# Patient Record
Sex: MALE | Race: WHITE | URBAN - METROPOLITAN AREA
[De-identification: names, ages, dates, MRNs, and addresses within clinical notes are randomized per-mention and may not be internally consistent; named-entity substitution may affect disease eponyms.]

---

## 2022-10-25 ENCOUNTER — OFFICE VISIT (OUTPATIENT)
Dept: FAMILY MEDICINE CLINIC | Age: 32
End: 2022-10-25
Payer: COMMERCIAL

## 2022-10-25 VITALS
HEART RATE: 66 BPM | HEIGHT: 72 IN | SYSTOLIC BLOOD PRESSURE: 115 MMHG | OXYGEN SATURATION: 96 % | DIASTOLIC BLOOD PRESSURE: 74 MMHG | RESPIRATION RATE: 16 BRPM | TEMPERATURE: 98.6 F | WEIGHT: 171 LBS | BODY MASS INDEX: 23.16 KG/M2

## 2022-10-25 DIAGNOSIS — Z12.83 SKIN CANCER SCREENING: ICD-10-CM

## 2022-10-25 DIAGNOSIS — M41.25 OTHER IDIOPATHIC SCOLIOSIS, THORACOLUMBAR REGION: ICD-10-CM

## 2022-10-25 DIAGNOSIS — Z00.00 GENERAL MEDICAL EXAM: Primary | ICD-10-CM

## 2022-10-25 DIAGNOSIS — N39.9 URINARY PROBLEM IN MALE: ICD-10-CM

## 2022-10-25 LAB
BILIRUB UR QL STRIP: NEGATIVE
GLUCOSE UR-MCNC: NEGATIVE MG/DL
KETONES P FAST UR STRIP-MCNC: NEGATIVE MG/DL
PH UR STRIP: 5.5 [PH] (ref 4.6–8)
PROT UR QL STRIP: NEGATIVE
SP GR UR STRIP: 1.02 (ref 1–1.03)
UA UROBILINOGEN AMB POC: NORMAL (ref 0.2–1)
URINALYSIS CLARITY POC: CLEAR
URINALYSIS COLOR POC: YELLOW
URINE BLOOD POC: NEGATIVE
URINE LEUKOCYTES POC: NEGATIVE
URINE NITRITES POC: NEGATIVE

## 2022-10-25 PROCEDURE — 99385 PREV VISIT NEW AGE 18-39: CPT | Performed by: FAMILY MEDICINE

## 2022-10-25 RX ORDER — CETIRIZINE HYDROCHLORIDE 10 MG/1
CAPSULE, LIQUID FILLED ORAL
COMMUNITY

## 2022-10-25 NOTE — PROGRESS NOTES
History of Present Illness:     Chief Complaint   Patient presents with    [facility]     Pt is a 33y.o. year old male    Presents to clinic to [facility].     Recently seen at an urgent care for penile discharge and itching  Started on antibiotics but stopped after testing came back negative  Followed by bladder fullness  All symptoms have since resolved    Sexually active with one female partner (wife)  No prior history of STIs  Has had a yeast infection in the past     Currently c/o congestion, runny nose  No fever  Tested negative for COVID home 2 days ago     Dx of Scoliosis  Previously followed by Ortho surgery  Required back brace in the past  Worries his posture is getting worse    I have reviewed patient's history as detailed below:    Past Medical History:   Diagnosis Date    Scoliosis        Past Surgical History:   Procedure Laterality Date    HX HEMORRHOIDECTOMY           Family History   Problem Relation Age of Onset    Neuropathy Father     Diabetes Maternal Grandmother     Dementia Paternal Grandmother     Heart Disease Paternal Grandfather        Social History     Socioeconomic History    Marital status: SINGLE     Spouse name: Not on file    Number of children: Not on file    Years of education: Not on file    Highest education level: Not on file   Occupational History    Not on file   Tobacco Use    Smoking status: Never    Smokeless tobacco: Never   Vaping Use    Vaping Use: Never used   Substance and Sexual Activity    Alcohol use: Never    Drug use: Never    Sexual activity: Not on file   Other Topics Concern    Not on file   Social History Narrative    Not on file     Social Determinants of Health     Financial Resource Strain: Not on file   Food Insecurity: Not on file   Transportation Needs: Not on file   Physical Activity: Not on file   Stress: Not on file   Social Connections: Not on file   Intimate Partner Violence: Not on file   Housing Stability: Not on file         Current Outpatient Medications on File Prior to Visit   Medication Sig Dispense Refill    Cetirizine (ZyrTEC) 10 mg cap Take  by mouth. No current facility-administered medications on file prior to visit. Allergies:  No Known Allergies      Review of systems:  Items bolded if positive. Constitutional: Fever, chills, night sweats, weight loss, lymphadenopathy, fatigue  HEENT: Vision change, eye pain, rhinorrhea, sinus pain, epistaxis, dysphagia, change in hearing, tinnitus, vertigo.    Endocrine: Weight change, heat/ cold intolerance, tremor, insomnia, polyuria, polydipsia, polyphagia, abnl hair growth, nail changes  Cardiovascular: Chest pain, palpitations, syncope, lower extremity edema, orthopnea, paroxysmal nocturnal dyspnea  Pulmonary: Shortness of breath, dyspnea on exertion, cough, hemoptysis, wheezing  GI: Nausea, vomiting, diarrhea, melena, hematochezia, change in appetite, abdominal pain, change in bowel habits or stools  : Dysuria, frequency, urgency, incontinence, hematuria, nocturia  Musculoskeletal: joint swelling or pain, muscle pain, back pain  Skin:  Rash, New/growing/changing skin lesions  Neurologic: Headache, muscle weakness, paresthesias, anesthesia, ataxia, change in speech, change in gait   Psychiatric: depression, anxiety, hallucinations, troy, SI/HI        Objective:     Vitals:    10/25/22 0859   BP: 115/74   Pulse: 66   Resp: 16   Temp: 98.6 °F (37 °C)   TempSrc: Oral   SpO2: 96%   Weight: 171 lb (77.6 kg)   Height: 6' (1.829 m)       Physical Exam:  General appearance - alert, well appearing, and in no distress  Mental status - alert, oriented to person, place, and time, normal mood, behavior, speech, dress, motor activity, and thought processes  Neck - supple, no significant adenopathy, thyroid exam: thyroid is normal in size without nodules or tenderness  Chest - clear to auscultation, no wheezes, rales or rhonchi, symmetric air entry  Heart - normal rate, regular rhythm, normal S1, S2, no murmurs, rubs, clicks or gallops  Abdomen - soft, nontender, nondistended, no masses or organomegaly  Back exam - scoliosis noted, high riding scapula on the right. Neurological - alert, oriented, normal speech, no focal findings or movement disorder noted  Extremities - peripheral pulses normal, no pedal edema, no clubbing or cyanosis      Recent Labs:  No results found for this or any previous visit (from the past 12 hour(s)). Assessment and Plan:   Pt is a 33y.o. year old male,      ICD-10-CM ICD-9-CM    1. General medical exam  Z00.00 V70.9 CBC W/O DIFF      METABOLIC PANEL, COMPREHENSIVE      LIPID PANEL      LIPID PANEL      METABOLIC PANEL, COMPREHENSIVE      CBC W/O DIFF      2. Other idiopathic scoliosis, thoracolumbar region  M41.25 737.30       3. Skin cancer screening  Z12.83 V76.43 REFERRAL TO DERMATOLOGY      4. Urinary problem in male  N39.9 V47.4 AMB POC URINALYSIS DIP STICK AUTO W/O MICRO          Establish care  Well overall  Will check labs today    Idiopathic scoliosis  Referred to Sports Med    Skin cancer screening  Derm referral placed     Urinary problem  Seems to have resolved but will check UA    Follow up for annual wellness, sooner if needed    Dandre Ochoa MD    I have discussed the diagnosis with the patient and the intended plan as seen in the above orders. The patient has received an after-visit summary and questions were answered concerning future plans.

## 2022-10-25 NOTE — PROGRESS NOTES
Lam Carrera is a 33 y.o. male    Chief Complaint   Patient presents with    Establish Care       1. Have you been to the ER, urgent care clinic since your last visit? Hospitalized since your last visit? No  2. Have you seen or consulted any other health care providers outside of the [address] [place] [address] since your last visit? Include any pap smears or colon screening. No    Visit Vitals  /74 (BP 1 Location: Left upper arm, BP Patient Position: Sitting)   Pulse 66   Temp 98.6 °F (37 °C) (Oral)   Resp 16   Ht 6' (1.829 m)   Wt 171 lb (77.6 kg)   SpO2 96%   BMI 23.19 kg/m²     3 most recent PHQ Screens 10/25/2022   Little interest or pleasure in doing things Not at all   Feeling down, depressed, irritable, or hopeless Not at all   Total Score PHQ 2 0     Health Maintenance Due   Topic Date Due    Hepatitis C Screening  Never done    Depression Screen  Never done    COVID-19 Vaccine (1) Never done    Flu Vaccine (1) Never done     Dayquil and nightquil   Issues urinating    Itchiness and discharge. Does remember being intimate with   Had gc/ chlamydia and bacteria culture tested, all negative but was given medicine awaiting results. Discharge and itchiness went away but was having fullness. Illness started Saturday and tested negative for Covid on Sunday. Sunday was the worst feels better today other sound.      Has had ~3 covid vaccines and flu shot     Hemmorhoidectomy feb 22 nov 21

## 2022-10-26 LAB
ALBUMIN SERPL-MCNC: 4 G/DL (ref 3.5–5)
ALBUMIN/GLOB SERPL: 1.3 {RATIO} (ref 1.1–2.2)
ALP SERPL-CCNC: 55 U/L (ref 45–117)
ALT SERPL-CCNC: 20 U/L (ref 12–78)
ANION GAP SERPL CALC-SCNC: 3 MMOL/L (ref 5–15)
AST SERPL-CCNC: 13 U/L (ref 15–37)
BILIRUB SERPL-MCNC: 0.4 MG/DL (ref 0.2–1)
BUN SERPL-MCNC: 15 MG/DL (ref 6–20)
BUN/CREAT SERPL: 13 (ref 12–20)
CALCIUM SERPL-MCNC: 9.2 MG/DL (ref 8.5–10.1)
CHLORIDE SERPL-SCNC: 103 MMOL/L (ref 97–108)
CHOLEST SERPL-MCNC: 140 MG/DL
CO2 SERPL-SCNC: 32 MMOL/L (ref 21–32)
CREAT SERPL-MCNC: 1.14 MG/DL (ref 0.7–1.3)
ERYTHROCYTE [DISTWIDTH] IN BLOOD BY AUTOMATED COUNT: 12.9 % (ref 11.5–14.5)
GLOBULIN SER CALC-MCNC: 3.1 G/DL (ref 2–4)
GLUCOSE SERPL-MCNC: 70 MG/DL (ref 65–100)
HCT VFR BLD AUTO: 47.4 % (ref 36.6–50.3)
HDLC SERPL-MCNC: 38 MG/DL
HDLC SERPL: 3.7 {RATIO} (ref 0–5)
HGB BLD-MCNC: 15.1 G/DL (ref 12.1–17)
LDLC SERPL CALC-MCNC: 57.4 MG/DL (ref 0–100)
MCH RBC QN AUTO: 29.7 PG (ref 26–34)
MCHC RBC AUTO-ENTMCNC: 31.9 G/DL (ref 30–36.5)
MCV RBC AUTO: 93.1 FL (ref 80–99)
NRBC # BLD: 0 K/UL (ref 0–0.01)
NRBC BLD-RTO: 0 PER 100 WBC
PLATELET # BLD AUTO: 215 K/UL (ref 150–400)
PMV BLD AUTO: 11 FL (ref 8.9–12.9)
POTASSIUM SERPL-SCNC: 4.5 MMOL/L (ref 3.5–5.1)
PROT SERPL-MCNC: 7.1 G/DL (ref 6.4–8.2)
RBC # BLD AUTO: 5.09 M/UL (ref 4.1–5.7)
SODIUM SERPL-SCNC: 138 MMOL/L (ref 136–145)
TRIGL SERPL-MCNC: 223 MG/DL (ref ?–150)
VLDLC SERPL CALC-MCNC: 44.6 MG/DL
WBC # BLD AUTO: 6.3 K/UL (ref 4.1–11.1)

## 2024-02-21 ENCOUNTER — OFFICE VISIT (OUTPATIENT)
Age: 34
End: 2024-02-21
Payer: COMMERCIAL

## 2024-02-21 VITALS
WEIGHT: 160.4 LBS | RESPIRATION RATE: 18 BRPM | TEMPERATURE: 98 F | BODY MASS INDEX: 21.73 KG/M2 | HEIGHT: 72 IN | HEART RATE: 77 BPM | SYSTOLIC BLOOD PRESSURE: 120 MMHG | DIASTOLIC BLOOD PRESSURE: 80 MMHG | OXYGEN SATURATION: 97 %

## 2024-02-21 DIAGNOSIS — E78.2 ELEVATED TRIGLYCERIDES WITH HIGH CHOLESTEROL: ICD-10-CM

## 2024-02-21 DIAGNOSIS — Z01.84 IMMUNITY STATUS TESTING: ICD-10-CM

## 2024-02-21 DIAGNOSIS — Z00.00 VISIT FOR WELL MAN HEALTH CHECK: Primary | ICD-10-CM

## 2024-02-21 PROBLEM — K60.2 FISSURE, ANAL: Status: ACTIVE | Noted: 2023-08-29

## 2024-02-21 PROBLEM — K64.9 HEMORRHOIDS: Status: ACTIVE | Noted: 2023-08-29

## 2024-02-21 PROCEDURE — 99395 PREV VISIT EST AGE 18-39: CPT | Performed by: FAMILY MEDICINE

## 2024-02-21 PROCEDURE — G8484 FLU IMMUNIZE NO ADMIN: HCPCS | Performed by: FAMILY MEDICINE

## 2024-02-21 RX ORDER — POLYETHYLENE GLYCOL 3350 17 G/17G
17 POWDER, FOR SOLUTION ORAL NIGHTLY
COMMUNITY

## 2024-02-21 RX ORDER — LIDOCAINE 5% 5 G/100G
CREAM TOPICAL
COMMUNITY
Start: 2024-01-18

## 2024-02-21 RX ORDER — AMITRIPTYLINE HYDROCHLORIDE 10 MG/1
10 TABLET, FILM COATED ORAL NIGHTLY
COMMUNITY
Start: 2024-02-08

## 2024-02-21 SDOH — ECONOMIC STABILITY: FOOD INSECURITY: WITHIN THE PAST 12 MONTHS, THE FOOD YOU BOUGHT JUST DIDN'T LAST AND YOU DIDN'T HAVE MONEY TO GET MORE.: NEVER TRUE

## 2024-02-21 SDOH — ECONOMIC STABILITY: FOOD INSECURITY: WITHIN THE PAST 12 MONTHS, YOU WORRIED THAT YOUR FOOD WOULD RUN OUT BEFORE YOU GOT MONEY TO BUY MORE.: NEVER TRUE

## 2024-02-21 SDOH — ECONOMIC STABILITY: HOUSING INSECURITY
IN THE LAST 12 MONTHS, WAS THERE A TIME WHEN YOU DID NOT HAVE A STEADY PLACE TO SLEEP OR SLEPT IN A SHELTER (INCLUDING NOW)?: NO

## 2024-02-21 SDOH — ECONOMIC STABILITY: INCOME INSECURITY: HOW HARD IS IT FOR YOU TO PAY FOR THE VERY BASICS LIKE FOOD, HOUSING, MEDICAL CARE, AND HEATING?: NOT HARD AT ALL

## 2024-02-21 ASSESSMENT — PATIENT HEALTH QUESTIONNAIRE - PHQ9
2. FEELING DOWN, DEPRESSED OR HOPELESS: 1
SUM OF ALL RESPONSES TO PHQ QUESTIONS 1-9: 2
1. LITTLE INTEREST OR PLEASURE IN DOING THINGS: 1
SUM OF ALL RESPONSES TO PHQ QUESTIONS 1-9: 2
SUM OF ALL RESPONSES TO PHQ9 QUESTIONS 1 & 2: 2

## 2024-02-21 NOTE — PROGRESS NOTES
Subjective:   Jermaine Ferrell is an 33 y.o. male who presents for complete physical exam.    Doing well.    Diet: Doing well. High in fiber.     Exercise: Recently started back working out. Now walking.     Followed by colorectal surgery for an anal fissure. Now has ongoing pain from it. Doing very well on Amitriptyline.     Did have some depression with the ongoing pain. Feels his mood is improving.     Works as a .     Health Maintenance reviewed -    HIV testing: Declined. Low risk.     Hepatitis C testing (born between 1947-65):  Declined. Low risk.     Allergies - reviewed:   No Known Allergies      Medications - reviewed:  Current Outpatient Medications   Medication Sig    Lidocaine 5 % CREA DILTIAZEM/LIDOCAINE 2/5% OINTMENT    amitriptyline (ELAVIL) 10 MG tablet Take 1 tablet by mouth nightly    polyethylene glycol (GLYCOLAX) 17 GM/SCOOP powder Take 17 g by mouth at bedtime Clearlax    Cetirizine HCl (ZYRTEC ALLERGY) 10 MG CAPS Take by mouth     No current facility-administered medications for this visit.         Past Medical History - reviewed:  Past Medical History:   Diagnosis Date    GERD (gastroesophageal reflux disease) 2021    Had hemorrhoidectomy in 2021/. Diagnosed with fissure in . 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 Taking amitripyline.    Scoliosis          Past Surgical History - reviewed:  Past Surgical History:   Procedure Laterality Date    HEMORRHOID SURGERY           Family History - reviewed:  Family History   Problem Relation Age of Onset    Heart Disease Paternal Grandfather         My grandfather suffered a heart attack at 55. Had stints put in and a pacemake later on in life.    Dementia Paternal Grandmother     Diabetes Maternal Grandmother         My grandmother had type 2    Neuropathy Father     Cancer Maternal Aunt         My fathers suster  when she was young from cancer         Social History - reviewed:  Social History     Socioeconomic

## 2024-02-21 NOTE — PROGRESS NOTES
Room: 14    Identified pt with two pt identifiers(name and ). Reviewed record in preparation for visit and have obtained necessary documentation.    Chief Complaint   Patient presents with    Annual Exam        Health Maintenance Due   Topic    Varicella vaccine (1 of 2 - 2-dose childhood series)    HIV screen     Flu vaccine (1)    COVID-19 Vaccine ( season)    Depression Screen        Vitals:    24 1357   BP: 120/80   Site: Left Upper Arm   Position: Sitting   Cuff Size: Medium Adult   Pulse: 77   Resp: 18   Temp: 98 °F (36.7 °C)   TempSrc: Oral   SpO2: 97%   Weight: 72.8 kg (160 lb 6.4 oz)   Height: 1.829 m (6')           Coordination of Care Questionnaire:  :   1. Have you been to the ER, urgent care clinic since your last visit?  Hospitalized since your last visit? [facility] In[facility]    2. Have you seen or consulted any other health care providers outside of the [facility] since your last visit?  Include any pap smears or colon screening. No    This patient is accompanied in the office by his self.  I have received verbal consent from Jermaine Ferrell to discuss any/all medical information while they are present in the room.

## 2024-02-22 LAB
ALBUMIN SERPL-MCNC: 4.2 G/DL (ref 3.5–5)
ALBUMIN/GLOB SERPL: 1.5 (ref 1.1–2.2)
ALP SERPL-CCNC: 56 U/L (ref 45–117)
ALT SERPL-CCNC: 20 U/L (ref 12–78)
ANION GAP SERPL CALC-SCNC: 0 MMOL/L (ref 5–15)
AST SERPL-CCNC: 17 U/L (ref 15–37)
BILIRUB SERPL-MCNC: 0.7 MG/DL (ref 0.2–1)
BUN SERPL-MCNC: 14 MG/DL (ref 6–20)
BUN/CREAT SERPL: 12 (ref 12–20)
CALCIUM SERPL-MCNC: 9.4 MG/DL (ref 8.5–10.1)
CHLORIDE SERPL-SCNC: 105 MMOL/L (ref 97–108)
CHOLEST SERPL-MCNC: 141 MG/DL
CO2 SERPL-SCNC: 33 MMOL/L (ref 21–32)
CREAT SERPL-MCNC: 1.18 MG/DL (ref 0.7–1.3)
ERYTHROCYTE [DISTWIDTH] IN BLOOD BY AUTOMATED COUNT: 13 % (ref 11.5–14.5)
GLOBULIN SER CALC-MCNC: 2.8 G/DL (ref 2–4)
GLUCOSE SERPL-MCNC: 101 MG/DL (ref 65–100)
HCT VFR BLD AUTO: 44 % (ref 36.6–50.3)
HDLC SERPL-MCNC: 42 MG/DL
HDLC SERPL: 3.4 (ref 0–5)
HGB BLD-MCNC: 15.1 G/DL (ref 12.1–17)
LDLC SERPL CALC-MCNC: 63.6 MG/DL (ref 0–100)
MCH RBC QN AUTO: 30.6 PG (ref 26–34)
MCHC RBC AUTO-ENTMCNC: 34.3 G/DL (ref 30–36.5)
MCV RBC AUTO: 89.2 FL (ref 80–99)
NRBC # BLD: 0 K/UL (ref 0–0.01)
NRBC BLD-RTO: 0 PER 100 WBC
PLATELET # BLD AUTO: 205 K/UL (ref 150–400)
PMV BLD AUTO: 10.9 FL (ref 8.9–12.9)
POTASSIUM SERPL-SCNC: 4.2 MMOL/L (ref 3.5–5.1)
PROT SERPL-MCNC: 7 G/DL (ref 6.4–8.2)
RBC # BLD AUTO: 4.93 M/UL (ref 4.1–5.7)
SODIUM SERPL-SCNC: 138 MMOL/L (ref 136–145)
TRIGL SERPL-MCNC: 177 MG/DL
VLDLC SERPL CALC-MCNC: 35.4 MG/DL
WBC # BLD AUTO: 5.3 K/UL (ref 4.1–11.1)

## 2024-02-23 LAB — VZV IGG SER IA-ACNC: 2593 INDEX

## 2024-05-15 ENCOUNTER — PATIENT MESSAGE (OUTPATIENT)
Age: 34
End: 2024-05-15

## 2024-05-15 DIAGNOSIS — K64.9 HEMORRHOIDS, UNSPECIFIED HEMORRHOID TYPE: Primary | ICD-10-CM

## 2024-05-15 NOTE — TELEPHONE ENCOUNTER
From: Jermaine Ferrell  To: Dr. Lisa Jaime  Sent: 5/15/2024 2:06 PM EDT  Subject: Referral for colorectal surgeon    Ricardo Jaime,    I wanted to touch base and let you know that I’m still waiting for my results for my colonoscopy. I noticed you have since requested them also.    I wanted to see whether you can send a referral for me to a [facility] surgeon. The fax number is [phone] or the email is [email or web address]    The reason why I wanted to see the surgeon is because of my hemorrhoids. My GI doctor didn’t seem all too concerned about them when they examined them during the colonoscopy. But I am in pain daily and would like to get the ball rolling on removing them.    Let me know if you have any questions.    Thanks!

## 2024-06-03 ENCOUNTER — PATIENT MESSAGE (OUTPATIENT)
Age: 34
End: 2024-06-03

## 2024-06-03 DIAGNOSIS — K60.2 FISSURE, ANAL: ICD-10-CM

## 2024-06-03 DIAGNOSIS — M41.25 OTHER IDIOPATHIC SCOLIOSIS, THORACOLUMBAR REGION: Primary | ICD-10-CM

## 2024-06-04 NOTE — TELEPHONE ENCOUNTER
From: Jermaine Ferrell  To: Dr. Lias Jaime  Sent: 6/3/2024 2:48 PM EDT  Subject: Amitriptyline     Hello!    Hope all is well with you. Thank you so much for your help with the referral process. And I have seen that the colorectal surgeon has sent you about my visit. I wanted to let you know I won’t be seeking the services from that provider. The appointment was very intense and they did not make me comfortable at all. I’m going to be taking a break with people poking and prodding to see if I can level out with my pain.    I did have a question about my medication. I am currently taking it at nighttime. But I have been noticing it wearing off about mid day the next day. I wasn’t sure if there was an option in taking it in the morning so I could have the most effects of the medication throughout the day but wasn’t sure if any of the side effects would be a cause for concern.     Let me know at your earliest convenience, this isn’t a huge rush!    Thanks

## 2024-06-06 RX ORDER — AMITRIPTYLINE HYDROCHLORIDE 50 MG/1
50 TABLET, FILM COATED ORAL DAILY
Qty: 90 TABLET | Refills: 0 | Status: SHIPPED | OUTPATIENT
Start: 2024-06-06

## 2024-09-01 DIAGNOSIS — K60.2 FISSURE, ANAL: ICD-10-CM

## 2024-09-03 RX ORDER — AMITRIPTYLINE HYDROCHLORIDE 50 MG/1
50 TABLET ORAL DAILY
Qty: 90 TABLET | Refills: 1 | Status: SHIPPED | OUTPATIENT
Start: 2024-09-03

## 2024-09-03 NOTE — TELEPHONE ENCOUNTER
Medication Refill Request    Jermaine Ferrell is requesting a refill of the following medication(s):   Requested Prescriptions     Pending Prescriptions Disp Refills    amitriptyline (ELAVIL) 50 MG tablet [Pharmacy Med Name: Amitriptyline HCl 50 MG Oral Tablet] 90 tablet 0     Sig: Take 1 tablet by mouth once daily        Listed PCP is Lisa Jaime MD   Last provider to prescribe medication: Dr. Jaime   Last Date of Medication Prescribed: 06/06/2024   Date of Last Office Visit at [facility]: 02/21/2024     Future Appointment: No future appointments.    Refill Request Note: Advised to Follow up for annual wellness visits 02/21/2025. Sooner if needed for mood.     Please send refill to:    [facility] [address] [place] [address] [address] [facility] [phone] - F [phone] [address] [facility] [address]  Phone: [phone] Fax: [phone]

## 2025-03-02 DIAGNOSIS — K60.2 FISSURE, ANAL: ICD-10-CM

## 2025-03-04 RX ORDER — AMITRIPTYLINE HYDROCHLORIDE 50 MG/1
50 TABLET ORAL DAILY
Qty: 90 TABLET | Refills: 1 | OUTPATIENT
Start: 2025-03-04

## 2025-03-04 NOTE — TELEPHONE ENCOUNTER
Medication Refill Request    Jermaine Ferrell is requesting a refill of the following medication(s):   Requested Prescriptions     Pending Prescriptions Disp Refills    amitriptyline (ELAVIL) 50 MG tablet [Pharmacy Med Name: AMITRIPTYLINE HCL 50 MG TAB] 30 tablet 2     Sig: TAKE 1 TABLET BY MOUTH EVERY DAY        Listed PCP is Lisa Jaime MD   Last provider to prescribe medication: Dr. Jaime on 9/3/24  Date of Last Office Visit at [facility]: 2/21/2024 with Dr. Jaime    FUTURE [facility] APPOINTMENT: 3/2/2025    Please send refill to:    [facility] [address] [facility] [address] [facility] - P [phone] - F [phone]  [address] [facility] [address]  Phone: [phone] Fax: [phone]    [facility]/pharmacy #[id number] - [place], [place] - [address] [facility] - P [phone] - F [phone]  [address] [facility] [address]  Phone: [phone] Fax: [phone]      Please review request and approve or deny with recommendations within 48 hours.

## 2025-03-05 RX ORDER — AMITRIPTYLINE HYDROCHLORIDE 50 MG/1
50 TABLET ORAL DAILY
Qty: 90 TABLET | Refills: 0 | Status: SHIPPED | OUTPATIENT
Start: 2025-03-05

## 2025-05-29 DIAGNOSIS — K60.2 FISSURE, ANAL: ICD-10-CM

## 2025-05-29 RX ORDER — AMITRIPTYLINE HYDROCHLORIDE 50 MG/1
50 TABLET ORAL DAILY
Qty: 90 TABLET | Refills: 0 | Status: SHIPPED | OUTPATIENT
Start: 2025-05-29

## 2025-05-29 NOTE — TELEPHONE ENCOUNTER
Medication Refill Request    Jermaine Ferrell is requesting a refill of the following medication(s):   Requested Prescriptions     Pending Prescriptions Disp Refills    amitriptyline (ELAVIL) 50 MG tablet 90 tablet 0     Sig: Take 1 tablet by mouth daily        Listed PCP is Lisa Jaime MD   Last provider to prescribe medication: Lisa Jaime MD  Last Date of Medication Prescribed: 03.05.25   Date of Last Office Visit at [facility]: 02.21.24   FUTURE APPOINTMENT: No future appointments.    Please send refill to:    [facility] [address] [place], [place] - [address] [facility] P [phone] - F [phone]  [address] [facility] [address]  Phone: [phone] Fax: [phone]    [facility]/pharmacy #[id number] - [place] - [address] [facility] -  [phone] - F [phone]  [address] [facility] [address]  Phone: [phone] Fax: [phone]      Please review request and approve or deny with recommendations.